# Patient Record
Sex: MALE | Race: WHITE | NOT HISPANIC OR LATINO | URBAN - METROPOLITAN AREA
[De-identification: names, ages, dates, MRNs, and addresses within clinical notes are randomized per-mention and may not be internally consistent; named-entity substitution may affect disease eponyms.]

---

## 2017-07-28 ENCOUNTER — FOLLOW UP ESTABLISHED (OUTPATIENT)
Dept: URBAN - METROPOLITAN AREA CLINIC 24 | Facility: CLINIC | Age: 77
End: 2017-07-28
Payer: MEDICARE

## 2017-07-28 DIAGNOSIS — Z96.1 PRESENCE OF INTRAOCULAR LENS: ICD-10-CM

## 2017-07-28 DIAGNOSIS — H26.492 OTHER SECONDARY CATARACT, LEFT EYE: ICD-10-CM

## 2017-07-28 PROCEDURE — 92134 CPTRZ OPH DX IMG PST SGM RTA: CPT | Performed by: OPTOMETRIST

## 2017-07-28 PROCEDURE — 92014 COMPRE OPH EXAM EST PT 1/>: CPT | Performed by: OPTOMETRIST

## 2017-07-28 ASSESSMENT — INTRAOCULAR PRESSURE
OS: 22
OD: 13

## 2017-07-28 ASSESSMENT — VISUAL ACUITY
OS: 20/20
OD: 20/250

## 2018-07-10 ENCOUNTER — FOLLOW UP ESTABLISHED (OUTPATIENT)
Dept: URBAN - METROPOLITAN AREA CLINIC 24 | Facility: CLINIC | Age: 78
End: 2018-07-10
Payer: MEDICARE

## 2018-07-10 DIAGNOSIS — H26.493 OTHER SECONDARY CATARACT, BILATERAL: ICD-10-CM

## 2018-07-10 PROCEDURE — 92134 CPTRZ OPH DX IMG PST SGM RTA: CPT | Performed by: OPTOMETRIST

## 2018-07-10 PROCEDURE — 92014 COMPRE OPH EXAM EST PT 1/>: CPT | Performed by: OPTOMETRIST

## 2018-07-10 ASSESSMENT — INTRAOCULAR PRESSURE
OD: 14
OS: 20

## 2018-07-10 ASSESSMENT — VISUAL ACUITY
OD: 20/250
OS: 20/20

## 2020-07-06 ENCOUNTER — FOLLOW UP ESTABLISHED (OUTPATIENT)
Dept: URBAN - METROPOLITAN AREA CLINIC 24 | Facility: CLINIC | Age: 80
End: 2020-07-06
Payer: MEDICARE

## 2020-07-06 DIAGNOSIS — H04.123 TEAR FILM INSUFFICIENCY OF BILATERAL LACRIMAL GLANDS: Primary | ICD-10-CM

## 2020-07-06 DIAGNOSIS — H35.371 PUCKERING OF MACULA, RIGHT EYE: ICD-10-CM

## 2020-07-06 PROCEDURE — 92134 CPTRZ OPH DX IMG PST SGM RTA: CPT | Performed by: OPTOMETRIST

## 2020-07-06 PROCEDURE — 92014 COMPRE OPH EXAM EST PT 1/>: CPT | Performed by: OPTOMETRIST

## 2020-07-06 ASSESSMENT — KERATOMETRY
OS: 44.55
OD: 44.83

## 2020-07-06 ASSESSMENT — INTRAOCULAR PRESSURE
OD: 15
OS: 20

## 2020-07-06 ASSESSMENT — VISUAL ACUITY
OS: 20/20
OD: 20/300

## 2021-07-12 ENCOUNTER — OFFICE VISIT (OUTPATIENT)
Dept: URBAN - METROPOLITAN AREA CLINIC 24 | Facility: CLINIC | Age: 81
End: 2021-07-12
Payer: OTHER MISCELLANEOUS

## 2021-07-12 DIAGNOSIS — H34.8112 CENTRAL RETINAL VEIN OCCLUSION, RIGHT EYE, STABLE: Primary | ICD-10-CM

## 2021-07-12 DIAGNOSIS — H52.4 PRESBYOPIA: ICD-10-CM

## 2021-07-12 PROCEDURE — 92250 FUNDUS PHOTOGRAPHY W/I&R: CPT | Performed by: OPTOMETRIST

## 2021-07-12 PROCEDURE — 99214 OFFICE O/P EST MOD 30 MIN: CPT | Performed by: OPTOMETRIST

## 2021-07-12 ASSESSMENT — INTRAOCULAR PRESSURE
OD: 14
OS: 15

## 2021-07-12 ASSESSMENT — KERATOMETRY
OD: 44.53
OS: 44.44

## 2021-07-12 NOTE — IMPRESSION/PLAN
Impression: Central retinal vein occlusion, stable, right eye
-- ischemic, onset 2004 Plan: Old, stable. No further action indicated. No tx possible. Pt advised. Updated photodocumenation today

## 2021-07-12 NOTE — IMPRESSION/PLAN
Impression: Puckering of macula, right eye Plan: ERM present. No cme, thickening, or complaints of metamorphopsia. Not visually significant.

## 2021-07-12 NOTE — IMPRESSION/PLAN
Impression: Other secondary cataract, bilateral
--INCIPIENT Plan: Opacified capsule not affecting vision. No indication for treatment. Return if decreased vision. Pt advised future yag os may be indicated.

## 2021-07-12 NOTE — IMPRESSION/PLAN
Impression: Presbyopia: H52.4. Plan: Recommend SRx for best vision. Polycarb / Trivex lense recommended. .    Due to effectively monocular status, reviewed monocular precautions (protective eyewear, risk trauma, caution, avoid high-risk activities for eye trauma, etc). Pt acknowldges understanding.

## 2023-06-15 ENCOUNTER — OFFICE VISIT (OUTPATIENT)
Dept: URBAN - METROPOLITAN AREA CLINIC 24 | Facility: CLINIC | Age: 83
End: 2023-06-15
Payer: OTHER MISCELLANEOUS

## 2023-06-15 DIAGNOSIS — H35.371 PUCKERING OF MACULA, RIGHT EYE: ICD-10-CM

## 2023-06-15 DIAGNOSIS — H52.4 PRESBYOPIA: ICD-10-CM

## 2023-06-15 DIAGNOSIS — H26.493 OTHER SECONDARY CATARACT, BILATERAL: ICD-10-CM

## 2023-06-15 DIAGNOSIS — H34.8112 CENTRAL RETINAL VEIN OCCLUSION, RIGHT EYE, STABLE: Primary | ICD-10-CM

## 2023-06-15 PROCEDURE — 99214 OFFICE O/P EST MOD 30 MIN: CPT | Performed by: OPTOMETRIST

## 2023-06-15 PROCEDURE — 92250 FUNDUS PHOTOGRAPHY W/I&R: CPT | Performed by: OPTOMETRIST

## 2023-06-15 ASSESSMENT — VISUAL ACUITY
OD: 20/150
OS: 20/20

## 2023-06-15 ASSESSMENT — KERATOMETRY
OD: 44.86
OS: 44.50

## 2023-06-15 ASSESSMENT — INTRAOCULAR PRESSURE
OD: 13
OS: 17

## 2023-06-15 NOTE — IMPRESSION/PLAN
Impression: Presbyopia: H52.4. Plan: Demonstrated; prefers. Released. Polycarb / Trivex lense recommended. .    Due to effectively monocular status, reviewed monocular precautions (protective eyewear, risk trauma, caution, avoid high-risk activities for eye trauma, etc). Pt acknowldges understanding.

## 2023-06-15 NOTE — IMPRESSION/PLAN
Impression: Central retinal vein occlusion, stable, right eye
-- ischemic, onset 2004 Plan: Old, stable. No further action indicated. Again, no tx possible. Pt advised. 
Updated photodocumenation today

## 2023-06-15 NOTE — IMPRESSION/PLAN
Impression: Other secondary cataract, bilateral
--INCIPIENT Plan: Opacified capsule not affecting vision. No indication for treatment. Return if decreased vision. Pt again advised future yag os may be indicated.

## 2024-07-16 ENCOUNTER — OFFICE VISIT (OUTPATIENT)
Dept: URBAN - METROPOLITAN AREA CLINIC 24 | Facility: CLINIC | Age: 84
End: 2024-07-16
Payer: OTHER MISCELLANEOUS

## 2024-07-16 DIAGNOSIS — H34.8112 CENTRAL RETINAL VEIN OCCLUSION, RIGHT EYE, STABLE: Primary | ICD-10-CM

## 2024-07-16 DIAGNOSIS — H26.493 OTHER SECONDARY CATARACT, BILATERAL: ICD-10-CM

## 2024-07-16 DIAGNOSIS — H35.371 PUCKERING OF MACULA, RIGHT EYE: ICD-10-CM

## 2024-07-16 PROCEDURE — 92014 COMPRE OPH EXAM EST PT 1/>: CPT | Performed by: OPTOMETRIST

## 2024-07-16 PROCEDURE — 92250 FUNDUS PHOTOGRAPHY W/I&R: CPT | Performed by: OPTOMETRIST

## 2024-07-16 ASSESSMENT — VISUAL ACUITY
OD: 20/400
OS: 20/20

## 2024-07-16 ASSESSMENT — INTRAOCULAR PRESSURE
OS: 14
OD: 14

## 2025-08-04 ENCOUNTER — OFFICE VISIT (OUTPATIENT)
Dept: URBAN - METROPOLITAN AREA CLINIC 24 | Facility: CLINIC | Age: 85
End: 2025-08-04
Payer: OTHER MISCELLANEOUS

## 2025-08-04 DIAGNOSIS — H34.8112 CENTRAL RETINAL VEIN OCCLUSION, RIGHT EYE, STABLE: Primary | ICD-10-CM

## 2025-08-04 DIAGNOSIS — H35.371 PUCKERING OF MACULA, RIGHT EYE: ICD-10-CM

## 2025-08-04 DIAGNOSIS — H26.493 OTHER SECONDARY CATARACT, BILATERAL: ICD-10-CM

## 2025-08-04 PROCEDURE — 92014 COMPRE OPH EXAM EST PT 1/>: CPT | Performed by: OPTOMETRIST

## 2025-08-04 PROCEDURE — 92250 FUNDUS PHOTOGRAPHY W/I&R: CPT | Performed by: OPTOMETRIST

## 2025-08-04 ASSESSMENT — INTRAOCULAR PRESSURE
OD: 9
OS: 10

## 2025-08-04 ASSESSMENT — VISUAL ACUITY
OS: 20/20
OD: CF

## 2025-08-04 ASSESSMENT — KERATOMETRY
OS: 44.23
OD: 44.83